# Patient Record
Sex: FEMALE | Race: WHITE | NOT HISPANIC OR LATINO | Employment: UNEMPLOYED | URBAN - METROPOLITAN AREA
[De-identification: names, ages, dates, MRNs, and addresses within clinical notes are randomized per-mention and may not be internally consistent; named-entity substitution may affect disease eponyms.]

---

## 2017-02-21 ENCOUNTER — TELEPHONE (OUTPATIENT)
Dept: ELECTROPHYSIOLOGY | Facility: CLINIC | Age: 37
End: 2017-02-21

## 2017-04-27 ENCOUNTER — TELEPHONE (OUTPATIENT)
Dept: ELECTROPHYSIOLOGY | Facility: CLINIC | Age: 37
End: 2017-04-27

## 2017-04-27 NOTE — TELEPHONE ENCOUNTER
Left message notifying pt that Dr. Rosenthal was booking out of clinic on 5/19/17 and that her appointment will be cancelled.  Instructed pt to call the office to reschedule appointment at her convenience.

## 2017-05-16 ENCOUNTER — TELEPHONE (OUTPATIENT)
Dept: ELECTROPHYSIOLOGY | Facility: CLINIC | Age: 37
End: 2017-05-16

## 2017-05-16 NOTE — TELEPHONE ENCOUNTER
Called pt's  and left a message to call the office back regarding scheduling an appt with Dr. Rosenthal

## 2017-06-06 DIAGNOSIS — Z95.9 UNSPECIFIED CARDIAC DEVICE IN SITU: ICD-10-CM

## 2017-06-06 DIAGNOSIS — R00.2 PALPITATIONS: Primary | ICD-10-CM

## 2017-06-07 ENCOUNTER — CLINICAL SUPPORT (OUTPATIENT)
Dept: ELECTROPHYSIOLOGY | Facility: CLINIC | Age: 37
End: 2017-06-07
Payer: COMMERCIAL

## 2017-06-07 ENCOUNTER — OFFICE VISIT (OUTPATIENT)
Dept: ELECTROPHYSIOLOGY | Facility: CLINIC | Age: 37
End: 2017-06-07
Payer: COMMERCIAL

## 2017-06-07 VITALS
BODY MASS INDEX: 24.38 KG/M2 | HEART RATE: 77 BPM | DIASTOLIC BLOOD PRESSURE: 80 MMHG | WEIGHT: 151.69 LBS | SYSTOLIC BLOOD PRESSURE: 112 MMHG | HEIGHT: 66 IN

## 2017-06-07 DIAGNOSIS — Z45.09 ENCOUNTER FOR LOOP RECORDER CHECK: ICD-10-CM

## 2017-06-07 DIAGNOSIS — I49.3 PVC (PREMATURE VENTRICULAR CONTRACTION): ICD-10-CM

## 2017-06-07 DIAGNOSIS — Z95.9 UNSPECIFIED CARDIAC DEVICE IN SITU: ICD-10-CM

## 2017-06-07 DIAGNOSIS — I49.3 PREMATURE VENTRICULAR CONTRACTIONS (PVCS) (VPCS): Primary | ICD-10-CM

## 2017-06-07 DIAGNOSIS — R00.2 PALPITATIONS: ICD-10-CM

## 2017-06-07 DIAGNOSIS — G90.1 DYSAUTONOMIA: ICD-10-CM

## 2017-06-07 PROCEDURE — 99999 PR PBB SHADOW E&M-EST. PATIENT-LVL III: CPT | Mod: PBBFAC,,, | Performed by: NURSE PRACTITIONER

## 2017-06-07 PROCEDURE — 93000 ELECTROCARDIOGRAM COMPLETE: CPT | Mod: S$GLB,,, | Performed by: INTERNAL MEDICINE

## 2017-06-07 PROCEDURE — 99214 OFFICE O/P EST MOD 30 MIN: CPT | Mod: S$GLB,,, | Performed by: NURSE PRACTITIONER

## 2017-06-07 NOTE — PROGRESS NOTES
"Subjective:    Patient ID:  Lizz Rogers is a 37 y.o. female who presents for follow-up of PVCs.     Lizz Rogers is a patient of Dr. Rosenthal.  Primary Cardiologist: Dr. Jewel Armendariz.    HPI     Ms. Rogers is a 37 year old female with dysautonomia, palpitations and PVCs. Ms. Rogers symptoms have historically manifested as palpitations with standing (she has noted an increased HR; by 30-40 bpm), fatigue, and feeling "shaky." She has also reported issues w/what she describes as "body homeostasis;" she has experienced alternating periods of being very hot w/associated sweating, followed by episodes of feeling very cold. In the past, she had been hospitalized 2/2 her symptoms, and at the time, she was told that she was simply deconditioned.   Ms. Rogers reportedly underwent a tilt-table test at [facility], and also reports undergoing a normal Echo and a normal stress test; no records available for review. A Holter Monitor in the past (09/08/15) revealed NSR w/an average HR 77 bpm; report indicated 2 episodes of inappropriate ST, w/HRs of 125 bpm; this correlated with mild symptoms. She reported that there were changes in her HR and ECG, but not her blood pressure. She has reported in the past the belief that there has been a correlation between her symptoms and the timing of her menses. She was initially placed on Diltiazem without improvement in her symptoms. She did report a hx of poor sleep patterns and recent discontinuation of birth control prior to the development of her initial symptoms.   At her initial office visit, lifestyle modifications were discussed and Cardizem was discontinued. She initially did well with this approach, and she had successfully worked her way up to 40 minutes of cardiovascular daily, and was feeling better overall.   Ultimately, however, she began to note increased palpitations and chest tightness. A Holter at the time (01/2016) revealed NSR with 25 PAC's, 48 PVC's, and atrial " triplets. (had symptoms at the 23rd hour of the monitor). She was subsequently placed on metoprolol 12.5 mg BID by Dr. Armendariz, which helped with her symptoms c/w dysautonomia, but did not help with her palpitations. Norvasc, potassium, and Magnesium were added. At the time, she stopped exercising, and her beta blocker was discontinued; with recommendations for utilization PRN. ILR implanted by Dr. Armendariz 9/6/16, and her symptoms correlated with occasional PVC's; w/symptoms worsening with exercise, after the fact. Given the fact that she had relatively few PVC s (although symptomatic) she was placed on propafenone.     Since her last office visit Ms. Rogers reports noted improvement in her symptoms since starting propafenone. She also notes continued rare CP which she describes as a pressure, lasting up to a day without associated symptoms; Dr. Armendariz aware; last Stress negative. She reports rare positional dizziness per baseline, lasting seconds; she notes a frequent irregular heartbeat (which she is able to terminate with a valsalva maneuver), as well as a constant strong heartbeat (worse when lying down). She denies syncope. She reports that her energy level remains stable, but states that she is unable to exercise, as this exacerbates her symptoms.    Recent cardiac studies:  ILR Interrogation (since last interrogation; 03/28/17) reveals no arrhythmia, bradycardia, pauses, AT/AF; prior there were 4 symptom-episodes (no auto-episodes) c/w SR w/a single PAC each.     I reviewed today's ECG which demonstrated NSR w/sinus arrhythmia at 77 bpm; , QRS 92, and QTc 411.    Review of Systems   Constitution: Negative for diaphoresis and malaise/fatigue.   HENT: Negative for headaches and nosebleeds.    Eyes: Negative for double vision.   Cardiovascular: Positive for chest pain, irregular heartbeat and palpitations. Negative for dyspnea on exertion, near-syncope and syncope.   Respiratory: Negative for shortness of  breath.    Skin: Negative.    Musculoskeletal: Negative.    Gastrointestinal: Negative for abdominal pain, hematemesis and hematochezia.   Genitourinary: Negative for hematuria.   Neurological: Positive for dizziness and light-headedness.   Psychiatric/Behavioral: Negative for altered mental status.        Objective:    Physical Exam   Constitutional: She is oriented to person, place, and time. She appears well-developed and well-nourished.   HENT:   Head: Normocephalic and atraumatic.   Eyes: Pupils are equal, round, and reactive to light.   Neck: Normal range of motion.   Cardiovascular: Normal rate, regular rhythm, normal heart sounds and intact distal pulses.    Pulmonary/Chest: Effort normal and breath sounds normal.   Abdominal: Soft.   Musculoskeletal: Normal range of motion.   Neurological: She is alert and oriented to person, place, and time.   Skin: She is not diaphoretic.   Vitals reviewed.        Assessment:       1. Premature ventricular contractions (PVCs) (VPCs)    2. Palpitations    3. Dysautonomia    4. Encounter for loop recorder check         Plan:       In summary, Ms. Rogers is a 37 y.o. female with PVCs, PACs, palpitations, dysautonomia, and ILR. Ms. Rogers is doing well from a rhythm perspective with stable lead and device function without arrhythmia noted on ILR interrogations to date.     Continue current medication regimen and device settings.   Follow up in device clinic as scheduled.   Follow up in EP clinic in 1 year (staggering appointments with Dr. Armendariz), sooner as needed.     Sabina Bauer, [place], APRN, FNP-C

## 2017-06-08 DIAGNOSIS — I49.3 PVC (PREMATURE VENTRICULAR CONTRACTION): Primary | ICD-10-CM

## 2017-10-26 DIAGNOSIS — I49.3 PVC (PREMATURE VENTRICULAR CONTRACTION): Primary | ICD-10-CM

## 2017-10-26 RX ORDER — PROPAFENONE HYDROCHLORIDE 150 MG/1
150 TABLET, COATED ORAL EVERY 8 HOURS
Qty: 90 TABLET | Refills: 11 | Status: SHIPPED | OUTPATIENT
Start: 2017-10-26 | End: 2018-10-29 | Stop reason: SDUPTHER

## 2018-07-31 DIAGNOSIS — Z95.818 STATUS POST PLACEMENT OF IMPLANTABLE LOOP RECORDER: Primary | ICD-10-CM

## 2018-07-31 DIAGNOSIS — I49.3 PVC'S (PREMATURE VENTRICULAR CONTRACTIONS): ICD-10-CM

## 2018-07-31 NOTE — PROGRESS NOTES
"Subjective:    Patient ID:  Lizz Rogers is a 38 y.o. female who presents for follow-up of Dysautonomia      HPI yo female with history of dysautonomia, palpitations.  Primary cardiologist is Dr. Jewel Armendariz.  Noted palpitations. With standing, has noted increase in HR, of 30-40 bpm. Not associated with dizziness, shortness of breath. Can sometimes feel tired and feel shaky.  Has also noted problems with what she describes as "body homeostasis." She notes alternating periods of being very hot and sweating, and very cold.  Reports tilt-table test being performed at [facility]. Report not available. She reports changes in her HR and ECG, but not her blood pressure.  Denies syncope. Notes intermittent fatigue. Lying down does not appear to help. Some correlation with menses.  Holter monitor 9/8/15 nsr average HR 77 bpm, Report indicates inappropriate sinus tachycardia, two episodes, with HR of 125 bpm. She reports mild symptoms during the wearing of the monitor.  Had been on Diltiazem for 2 months without improvement in symptoms  Switched to Propafenone 2 years ago.  Then, around Xmas, noted increasing palpitations and chest tightness. Holter 1/16 NSR with 25 PAC's, 48 PVC's, atrial triplets. (had symptoms at the 23rd hour of the monitor).  Placed on metoprolol 12.5 mg BID by Dr. Armendariz. This helped further with the symptoms c/w dysautonomia, did not help with the palpitations. Norvasc added, and potassium and Magnesium added. Stopped exercising.  We discontinued beta blocker, and recommended utilization PRN.  ILR implanted by Dr. Armendariz 9/6/16.  Symptoms correlated with PVC's.  ECG reveals nsr with normal baseline intervals.   Symptoms c/w dysautonomia are improving.  Month of June had worsening symptoms of the dysautonomia.  Has improved since then.  Has been gradually increasing her day to day activity level.  Has not been pushing exercise.  From a PVC perspective, she continues to do well.    Review of " "Systems   Constitution: Negative. Negative for weakness and malaise/fatigue.   Cardiovascular: Negative for chest pain, dyspnea on exertion, irregular heartbeat, leg swelling, near-syncope, orthopnea, palpitations, paroxysmal nocturnal dyspnea and syncope.   Respiratory: Negative for cough and shortness of breath.    Neurological: Negative for dizziness and light-headedness.   All other systems reviewed and are negative.       Objective:    Physical Exam   Constitutional: She is oriented to person, place, and time. She appears well-developed and well-nourished.   Eyes: Conjunctivae are normal. No scleral icterus.   Neck: No JVD present. No tracheal deviation present.   Cardiovascular: Normal rate and regular rhythm.  PMI is not displaced.    Pulmonary/Chest: Effort normal and breath sounds normal. No respiratory distress.   Abdominal: Soft. There is no hepatosplenomegaly. There is no tenderness.   Musculoskeletal: She exhibits no edema or tenderness.   Neurological: She is alert and oriented to person, place, and time.   Skin: Skin is warm and dry. No rash noted.   Psychiatric: She has a normal mood and affect. Her behavior is normal.         Assessment:       1. Premature ventricular contractions (PVCs) (VPCs)    2. Palpitations    3. Dysautonomia         Plan:           She is asking regarding utilizing Propafenone PRN for bad months ("I have good months and bad months"), and I believe this is reasonable.  Continue beta blocker.  Increase activity as tolerated.  F/u in 6 months.        "

## 2018-08-01 ENCOUNTER — OFFICE VISIT (OUTPATIENT)
Dept: ELECTROPHYSIOLOGY | Facility: CLINIC | Age: 38
End: 2018-08-01
Payer: COMMERCIAL

## 2018-08-01 ENCOUNTER — CLINICAL SUPPORT (OUTPATIENT)
Dept: ELECTROPHYSIOLOGY | Facility: CLINIC | Age: 38
End: 2018-08-01
Attending: INTERNAL MEDICINE
Payer: COMMERCIAL

## 2018-08-01 ENCOUNTER — HOSPITAL ENCOUNTER (OUTPATIENT)
Dept: CARDIOLOGY | Facility: CLINIC | Age: 38
Discharge: HOME | End: 2018-08-01
Payer: COMMERCIAL

## 2018-08-01 VITALS
SYSTOLIC BLOOD PRESSURE: 108 MMHG | HEIGHT: 66 IN | DIASTOLIC BLOOD PRESSURE: 62 MMHG | WEIGHT: 150 LBS | HEART RATE: 67 BPM | BODY MASS INDEX: 24.11 KG/M2

## 2018-08-01 DIAGNOSIS — R00.2 PALPITATIONS: Primary | ICD-10-CM

## 2018-08-01 DIAGNOSIS — R00.2 PALPITATIONS: ICD-10-CM

## 2018-08-01 DIAGNOSIS — I49.3 PVC'S (PREMATURE VENTRICULAR CONTRACTIONS): ICD-10-CM

## 2018-08-01 DIAGNOSIS — Z95.818 STATUS POST PLACEMENT OF IMPLANTABLE LOOP RECORDER: ICD-10-CM

## 2018-08-01 DIAGNOSIS — G90.1 DYSAUTONOMIA: ICD-10-CM

## 2018-08-01 DIAGNOSIS — I49.3 PREMATURE VENTRICULAR CONTRACTIONS (PVCS) (VPCS): Primary | ICD-10-CM

## 2018-08-01 PROCEDURE — 93000 ELECTROCARDIOGRAM COMPLETE: CPT | Mod: S$GLB,,, | Performed by: INTERNAL MEDICINE

## 2018-08-01 PROCEDURE — 3008F BODY MASS INDEX DOCD: CPT | Mod: CPTII,S$GLB,, | Performed by: INTERNAL MEDICINE

## 2018-08-01 PROCEDURE — 99214 OFFICE O/P EST MOD 30 MIN: CPT | Mod: S$GLB,,, | Performed by: INTERNAL MEDICINE

## 2018-08-01 PROCEDURE — 93285 PRGRMG DEV EVAL SCRMS IP: CPT | Mod: S$GLB,,, | Performed by: INTERNAL MEDICINE

## 2018-08-01 PROCEDURE — 99999 PR PBB SHADOW E&M-EST. PATIENT-LVL III: CPT | Mod: PBBFAC,,, | Performed by: INTERNAL MEDICINE

## 2018-10-29 DIAGNOSIS — I49.3 PVC (PREMATURE VENTRICULAR CONTRACTION): ICD-10-CM

## 2018-10-29 RX ORDER — PROPAFENONE HYDROCHLORIDE 150 MG/1
150 TABLET, COATED ORAL EVERY 8 HOURS
Qty: 90 TABLET | Refills: 11 | Status: SHIPPED | OUTPATIENT
Start: 2018-10-29 | End: 2019-10-29

## 2021-01-05 RX ORDER — METOPROLOL TARTRATE 25 MG/1
25 TABLET, FILM COATED ORAL 3 TIMES DAILY
Qty: 270 TABLET | Refills: 1 | Status: SHIPPED | OUTPATIENT
Start: 2021-01-05

## 2021-01-05 RX ORDER — POTASSIUM CHLORIDE 600 MG/1
8 TABLET, FILM COATED, EXTENDED RELEASE ORAL ONCE
Qty: 30 TABLET | Refills: 1 | Status: SHIPPED | OUTPATIENT
Start: 2021-01-05 | End: 2021-01-06 | Stop reason: SDUPTHER

## 2021-01-06 RX ORDER — POTASSIUM CHLORIDE 600 MG/1
8 TABLET, FILM COATED, EXTENDED RELEASE ORAL DAILY
Qty: 90 TABLET | Refills: 1 | Status: SHIPPED | OUTPATIENT
Start: 2021-01-06

## 2021-01-12 ENCOUNTER — TELEPHONE (OUTPATIENT)
Dept: CARDIOLOGY | Facility: CLINIC | Age: 41
End: 2021-01-12